# Patient Record
Sex: FEMALE | Race: WHITE | Employment: OTHER | URBAN - METROPOLITAN AREA
[De-identification: names, ages, dates, MRNs, and addresses within clinical notes are randomized per-mention and may not be internally consistent; named-entity substitution may affect disease eponyms.]

---

## 2020-11-09 ENCOUNTER — EXTERNAL ORDER RESULTS (OUTPATIENT)
Dept: ONCOLOGY | Facility: CLINIC | Age: 65
End: 2020-11-09

## 2020-11-09 DIAGNOSIS — C25.9 PANCREATIC ADENOCARCINOMA (H): Primary | ICD-10-CM

## 2020-11-10 NOTE — TELEPHONE ENCOUNTER
Action    Action Taken 11/10/20:    -Imaging previously resolved to PACS.    -[employer] Tracking/Muslim - Path: [id number]       RECORDS STATUS - ALL OTHER DIAGNOSIS      RECORDS RECEIVED FROM: [facility]   DATE RECEIVED:    NOTES STATUS DETAILS   OFFICE NOTE from referring provider CE - HP/PN Dr. Turang Behbahani: 11/5/20    Also Seen:  Urology - [place]: 8/21/20   OFFICE NOTE from medical oncologist [place] - PN/HP Dr. Behbahani   DISCHARGE SUMMARY from hospital NA    DISCHARGE REPORT from the ER NA    OPERATIVE REPORT CE - PN/Muslim 10/13/20: Josh Ji endoscopic ultrasonography    10/8/20: Cystoscopy, bladder biopsy   MEDICATION LIST CE - PN 11/5/20   CLINICAL TRIAL TREATMENTS TO DATE     LABS     PATHOLOGY REPORTS Muslim, Report in CE, Slides requested 11/10 11/2/20: [id number]  10/13/20: [id number]  10/13/20: [id number]  10/8/20: [id number]  8/21/20: [id number]   ANYTHING RELATED TO DIAGNOSIS Epic/CE 11/2/20   GENONOMIC TESTING     TYPE:     IMAGING (NEED IMAGES & REPORT)     CT SCANS PACS 11/2/20, 9/11/20, 8/24/20: HP, Reports in CE   MRI PACS 9/11/20: HP, Report in CE   MAMMO     ULTRASOUND     PET PACS 10/28/20: HP, Report in CE

## 2020-11-13 ENCOUNTER — PRE VISIT (OUTPATIENT)
Dept: ONCOLOGY | Facility: CLINIC | Age: 65
End: 2020-11-13

## 2020-11-13 ENCOUNTER — VIRTUAL VISIT (OUTPATIENT)
Dept: ONCOLOGY | Facility: CLINIC | Age: 65
End: 2020-11-13
Attending: INTERNAL MEDICINE
Payer: COMMERCIAL

## 2020-11-13 DIAGNOSIS — C25.9 PANCREATIC ADENOCARCINOMA (H): ICD-10-CM

## 2020-11-13 PROCEDURE — 99205 OFFICE O/P NEW HI 60 MIN: CPT | Mod: GT | Performed by: INTERNAL MEDICINE

## 2020-11-13 PROCEDURE — 999N001193 HC VIDEO/TELEPHONE VISIT; NO CHARGE

## 2020-11-13 RX ORDER — HYDROCHLOROTHIAZIDE 25 MG/1
25 TABLET ORAL
COMMUNITY
Start: 2019-10-15

## 2020-11-13 RX ORDER — LORAZEPAM 0.5 MG/1
.5-1 TABLET ORAL
COMMUNITY
Start: 2020-11-05

## 2020-11-13 NOTE — PROGRESS NOTES
Service Date: 11/13/2020      MEDICAL ONCOLOGY NEW PATIENT VISIT      REFERRING PHYSICIAN AND PRIMARY ONCOLOGIST:    Turang Behbahani, MD, [facility], [place], [place].      CANCER DIAGNOSIS:  Metastatic/stage IV form of pancreatic adenocarcinoma with several small liver metastases from the primary pancreatic tumor.  The patient presents for a second opinion upon the kind recommendation of her oncologist, Dr. Behbahani for treatment options.      HISTORY OF PRESENT ILLNESS:  Ms. Li Lizama is a 64-year-old woman who is joining me from her home in [place] via [place]-based virtual video visit.  This is in the setting of the ongoing coronavirus pandemic and precautionary stay-at-home orders.  She is joined by her .  I am in my academic office during this encounter, which takes place between 11:24 a.m. and 11:41 a.m., Friday, 11/13/2020.      She had developed urinary tract infections, which were unusual for her, in 12/2019 and 01/2020 and 02/2020.  There was concern for developing flank pain.  She met with urologist, Dr. Hubbard, 08/21/2020 to followup on these related issues.  A CT scan of the abdomen and pelvis was ordered to evaluate the flank pain and was concerning for incidental findings that included a cystic lesion at the pancreatic tail measuring 1.4 cm.      Pancreatic MRI was recommended.  More incidentally, she had a nonobstructing calculus in each kidney with subcentimeter nodular opacity at the right lung base thought to be infectious or inflammatory etiology.  She had further evaluation with MRI pancreas 09/11/2020 and this showed at the pancreas tail there was a cystic lesion that was lobulated measuring 2.6 x 2.6 x 2 cm.  There was some mild upstream dilatation of the pancreatic duct.  There was also incidental finding of cysts in the liver and the right kidney and again nodules in the right lung.  There was some concern  for possible malignancy and evaluation needed.      A CT chest was done without contrast showing tree-in-bud configuration of the bilateral nodule lung opacities but no obvious metastatic disease.  She underwent upper endoscopic ultrasound on 10/13 at [facility].  A 33 x 25 mm cross-sectional diameter mass was identified at the area of the cysts seen on MRI and on the prior CT.  There was no obvious pathology seen.  However, on pathologic review, there was evidence of adenocarcinoma.  She met with medical oncologist, Dr. Turang Behbahani, 10/19 at that time with the notion this was a localized pancreatic adenocarcinoma.      Further scans were performed in the form of a PET/CT done 10/28 for baseline staging prior to initiation of chemotherapy.  In addition to hypermetabolic activity in the pancreatic tail lesion, there were 3 additional hypermetabolic lesions in hepatic parenchyma, worrisome for metastatic disease.  The largest of this was about 1.2 cm.  With some concern for possible metastatic malignancy, a CT-guided liver mass biopsy was performed 11/02 and unfortunately was diagnostic of minute foci of adenocarcinoma.      She then again met with her oncologist and there was discussion of palliative-intent chemotherapy in the setting of now known disseminated stage IV form of disease but with some inclination to perhaps pursue regional therapy to the liver due to relatively low tumor burden.  Dr. Behbahani reached out to me last week requesting a second opinion for the patient and to consider regional therapy.  The patient is here today with her .  Her primary question is whether or not surgery or regional therapy would be beneficial and could be performed considering the low amount of tumor burden that is now biopsy proven.  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  Other than the question about intent-to-cure interventions and  the liver metastasis, she does not have any other significant questions.  She is scheduled to start chemotherapy in the form the FOLFIRINOX regimen this coming Monday, 11/16.      PAST MEDICAL/PAST SURGICAL HISTORY:  Relatively unremarkable.  She does not have any known history of acute or chronic pancreatitis.  She has some history of chronic UTIs as noted above.  She is on alendronate for osteopenia versus osteoporosis.  She has a history of migraine headaches, multiple sclerosis and optic neuritis.  Per the outside medical record, she also has diffuse cystic mastopathy.  She had an adenomatous polyp of the colon detected on colonoscopy 12/13/2019.  Other surgical history is only notable for an appendectomy and the guided biopsies to diagnose pancreas carcinoma and liver metastasis from the pancreas carcinoma.      FAMILY HISTORY:  Family history of malignancy includes her mother had breast cancer in her mid 70s.  Paternal grandmother had some kind of cancer of the skin.  No other significant family history of malignancy is noted related to the pancreas cancer.      MEDICATIONS AND ALLERGIES:  Fully reviewed in Epic.   Current Outpatient Medications   Medication     Abaloparatide 3120 MCG/1.56ML SOPN injection     estradiol (ESTRING) 2 MG vaginal ring     hydrochlorothiazide (HYDRODIURIL) 25 MG tablet     LORazepam (ATIVAN) 0.5 MG tablet     No current facility-administered medications for this visit.       No Known Allergies     REVIEW OF SYSTEMS:  Full 14-point review of systems was performed.  Pertinent symptoms are reviewed above per HPI.      SOCIAL HISTORY:  The patient lives in [place] with her , Jaleel, who joins us on the line.  She has 3 children.  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  No significant alcohol, tobacco or illicit drug use endorsed or described in the medical record.      PHYSICAL EXAMINATION:  Physical exam could not be performed today in context of a  Virtual Visit during the COVID19/Coronavirus pandemic.  Vitals - Patient Reported  Weight (Patient Reported): 68.9 kg (152 lb)  Pain Score: No Pain (0)         Observed physical assessments made today by visualizing the patient by video link:    General/Constitutional: Generally appears well, not acutely ill.  HEENT: no scleral icterus, not jaundiced.  Respiratory: no labored breathing.  Musculoskeletal: appears to have full range of motion and adequate physical strength.  Skin: no jaundice, discoloration or other notable lesions.  Neurological: no evidence of tremors.  Psychiatric: no evident anxiety; fully alert and oriented with fluent speech.      The rest of a comprehensive physical examination is deferred due to PHE (public health emergency) video visit restrictions.       Outside labs, pathology, radiology and imaging fully reviewed by me prior to the visit.  Pertinent points were reviewed by me with the patient in lay language through the course of this conversation.      IMPRESSION/PLAN:  Ms. Candy Lizama is a 64-year-old woman with no significant prior past medical history or family history of pancreatic or related carcinomas.  She has a newly diagnosed pancreatic tail adenocarcinoma that was incidentally detected when she developed mild flank pain in the setting of prior UTIs over the last year.  CT scan and MRI have been done in succession and endoscopic ultrasound and FNA revealed a pancreatic tail mass adenocarcinoma.      A PET/CT revealed some small areas of hepatic metastasis, largest of which was only over a centimeter in diameter but unfortunately was diagnostic of adenocarcinoma that was metastatic from pancreas tumor based on recent biopsy.  She has had discussion about chemotherapies with Dr. Behbahani and has selected FOLFIRINOX which she will initiate 3 days from now.  Genomic profiling using the [employer] platform is underway to address potential targetable mutations for future treatment  options in the future.      Her main questions for me today are whether or not to pursue liver-directed surgery or targeted therapy using regional embolization or something similar to that approach due to relatively low tumor burden.  We reviewed the nature that approximately half of all pancreas adenocarcinomas diagnosed each year are metastatic in nature and the liver is a preferential site for metastasis from the pancreatic tumor.  Regardless of the relatively low tumor burden, due to the aggressive nature of the biology of the disease, in general surgical resection or other modalities focused on the liver are not recommended.  It would not be considered Amish and that is in comparison to the relatively small number of early stage IV form of colorectal cancer that might merit such an approach.  Unfortunately, chemotherapy would be given with palliative intent, but the fact that the patient has relatively few, if any, symptoms and also relatively minimal amount of tumor burden compared to most patients would hopefully be in her favor.      Her  asked whether or not FOLFIRINOX would be likely to work, and unfortunately, we cannot predict chemosensitivities based on current tools of many types of chemotherapy, whether they comprise FOLFIRINOX or gemcitabine and nab-paclitaxel.  Ultimately, the most recent PET/CT may serve as an effective baseline compared to the scan that will be done after several months of chemotherapy and that would help to delineate better responsiveness of the patient's tumor to chemotherapy.  She did state that if in 1 year's time she has had exceptional or equivalent response to treatment without presumed progression of disease would she be eligible for surgery or interventional treatments to the liver.  I stated that would be unorthodox, but more often than not, patients usually would have progression of disease well under a year.  I did wish the patient well.  She will return to  the excellent care of Dr. Behbahani to initiate palliative-intent first-line chemotherapy in 3 days.      Thank you very much for this kind referral.     VIRTUAL VISIT - DETAILS:    I have reviewed the note as documented above. This accurately captures the substance of my conversation with the patient.    Date of call: 2020   Start of call: 11:24 a.m.   End of call: 11:41 a.m.    Provider location: [facility] (academic office)  Patient location: Home        Mode of Video Visit: Andrew         I spent 40 minutes prior to the visit reviewing updated information regarding this case, including notes, imaging, labs, and other pertinent results, as well as discussion with the referring physician.      Pietro Hodges MD PhD              D: 2020   T: 2020   MT: taisha      Name:     JAIRON DUQUE   MRN:      [id number]        Account:      [id number]   :      1955           Service Date: 2020      Document: [id number]

## 2020-11-13 NOTE — LETTER
"    11/13/2020         RE: Li Lizama  [address] [place] [id number]        Dear Colleague,    Thank you for referring your patient, Li Lizama, to the [facility]. Please see a copy of my visit note below.    Li Lizama is a 64 year old female who is being evaluated via a billable video visit.      The patient has been notified of following:     \"This video visit will be conducted via a call between you and your physician/provider. We have found that certain health care needs can be provided without the need for an in-person physical exam.  This service lets us provide the care you need with a video conversation.  If a prescription is necessary we can send it directly to your pharmacy.  If lab work is needed we can place an order for that and you can then stop by our lab to have the test done at a later time.    Video visits are billed at different rates depending on your insurance coverage.  Please reach out to your insurance provider with any questions.    If during the course of the call the physician/provider feels a video visit is not appropriate, you will not be charged for this service.\"    Patient has given verbal consent for Video visit? Yes  How would you like to obtain your AVS? MyChart  If you are dropped from the video visit, the video invite should be resent to: Text to cell phone: [phone]  Will anyone else be joining your video visit? No      Lacey Kraus RMA            Service Date: 11/13/2020      MEDICAL ONCOLOGY NEW PATIENT VISIT      REFERRING PHYSICIAN AND PRIMARY ONCOLOGIST:    Turang Behbahani, MD, [facility], [place].      CANCER DIAGNOSIS:  Metastatic/stage IV form of pancreatic adenocarcinoma with several small liver metastases from the primary pancreatic tumor.  The patient presents for a second opinion upon the kind recommendation of her " oncologist, Dr. Behbahani for treatment options.      HISTORY OF PRESENT ILLNESS:  Ms. Li Lizama is a 64-year-old woman who is joining me from her home in [place] via [employer]-based virtual video visit.  This is in the setting of the ongoing coronavirus pandemic and precautionary stay-at-home orders.  She is joined by her .  I am in my academic office during this encounter, which takes place between 11:24 a.m. and 11:41 a.m., Friday, 11/13/2020.      She had developed urinary tract infections, which were unusual for her, in 12/2019 and 01/2020 and 02/2020.  There was concern for developing flank pain.  She met with urologist, Dr. Hubbard, 08/21/2020 to followup on these related issues.  A CT scan of the abdomen and pelvis was ordered to evaluate the flank pain and was concerning for incidental findings that included a cystic lesion at the pancreatic tail measuring 1.4 cm.      Pancreatic MRI was recommended.  More incidentally, she had a nonobstructing calculus in each kidney with subcentimeter nodular opacity at the right lung base thought to be infectious or inflammatory etiology.  She had further evaluation with MRI pancreas 09/11/2020 and this showed at the pancreas tail there was a cystic lesion that was lobulated measuring 2.6 x 2.6 x 2 cm.  There was some mild upstream dilatation of the pancreatic duct.  There was also incidental finding of cysts in the liver and the right kidney and again nodules in the right lung.  There was some concern for possible malignancy and evaluation needed.      A CT chest was done without contrast showing tree-in-bud configuration of the bilateral nodule lung opacities but no obvious metastatic disease.  She underwent upper endoscopic ultrasound on 10/13 at [facility].  A 33 x 25 mm cross-sectional diameter mass was identified at the area of the cysts seen on MRI and on the prior CT.  There was no obvious pathology seen.  However, on pathologic review, there  was evidence of adenocarcinoma.  She met with medical oncologist, Dr. Turang Behbahani, 10/19 at that time with the notion this was a localized pancreatic adenocarcinoma.      Further scans were performed in the form of a PET/CT done 10/28 for baseline staging prior to initiation of chemotherapy.  In addition to hypermetabolic activity in the pancreatic tail lesion, there were 3 additional hypermetabolic lesions in hepatic parenchyma, worrisome for metastatic disease.  The largest of this was about 1.2 cm.  With some concern for possible metastatic malignancy, a CT-guided liver mass biopsy was performed 11/02 and unfortunately was diagnostic of minute foci of adenocarcinoma.      She then again met with her oncologist and there was discussion of palliative-intent chemotherapy in the setting of now known disseminated stage IV form of disease but with some inclination to perhaps pursue regional therapy to the liver due to relatively low tumor burden.  Dr. Behbahani reached out to me last week requesting a second opinion for the patient and to consider regional therapy.  The patient is here today with her .  Her primary question is whether or not surgery or regional therapy would be beneficial and could be performed considering the low amount of tumor burden that is now biopsy proven.  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  Other than the question about intent-to-cure interventions and the liver metastasis, she does not have any other significant questions.  She is scheduled to start chemotherapy in the form the FOLFIRINOX regimen this coming Monday, 11/16.      PAST MEDICAL/PAST SURGICAL HISTORY:  Relatively unremarkable.  She does not have any known history of acute or chronic pancreatitis.  She has some history of chronic UTIs as noted above.  She is on alendronate for osteopenia versus osteoporosis.  She has a history of migraine  headaches, multiple sclerosis and optic neuritis.  Per the outside medical record, she also has diffuse cystic mastopathy.  She had an adenomatous polyp of the colon detected on colonoscopy 12/13/2019.  Other surgical history is only notable for an appendectomy and the guided biopsies to diagnose pancreas carcinoma and liver metastasis from the pancreas carcinoma.      FAMILY HISTORY:  Family history of malignancy includes her mother had breast cancer in her mid 70s.  Paternal grandmother had some kind of cancer of the skin.  No other significant family history of malignancy is noted related to the pancreas cancer.      MEDICATIONS AND ALLERGIES:  Fully reviewed in Epic.   Current Outpatient Medications   Medication     Abaloparatide 3120 MCG/1.56ML SOPN injection     estradiol (ESTRING) 2 MG vaginal ring     hydrochlorothiazide (HYDRODIURIL) 25 MG tablet     LORazepam (ATIVAN) 0.5 MG tablet     No current facility-administered medications for this visit.       No Known Allergies     REVIEW OF SYSTEMS:  Full 14-point review of systems was performed.  Pertinent symptoms are reviewed above per HPI.      SOCIAL HISTORY:  The patient lives in [place] with her , Jaleel, who joins us on the line.  She has 3 children.  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  No significant alcohol, tobacco or illicit drug use endorsed or described in the medical record.      PHYSICAL EXAMINATION:  Physical exam could not be performed today in context of a Virtual Visit during the COVID19/Coronavirus pandemic.  Vitals - Patient Reported  Weight (Patient Reported): 68.9 kg (152 lb)  Pain Score: No Pain (0)         Observed physical assessments made today by visualizing the patient by video link:    General/Constitutional: Generally appears well, not acutely ill.  HEENT: no scleral icterus, not jaundiced.  Respiratory: no labored breathing.  Musculoskeletal: appears to have full range of motion and adequate  physical strength.  Skin: no jaundice, discoloration or other notable lesions.  Neurological: no evidence of tremors.  Psychiatric: no evident anxiety; fully alert and oriented with fluent speech.      The rest of a comprehensive physical examination is deferred due to [facility] (public health emergency) video visit restrictions.       Outside labs, pathology, radiology and imaging fully reviewed by me prior to the visit.  Pertinent points were reviewed by me with the patient in lay language through the course of this conversation.      IMPRESSION/PLAN:  Ms. Candy Lizama is a 64-year-old woman with no significant prior past medical history or family history of pancreatic or related carcinomas.  She has a newly diagnosed pancreatic tail adenocarcinoma that was incidentally detected when she developed mild flank pain in the setting of prior UTIs over the last year.  CT scan and MRI have been done in succession and endoscopic ultrasound and FNA revealed a pancreatic tail mass adenocarcinoma.      A PET/CT revealed some small areas of hepatic metastasis, largest of which was only over a centimeter in diameter but unfortunately was diagnostic of adenocarcinoma that was metastatic from pancreas tumor based on recent biopsy.  She has had discussion about chemotherapies with Dr. Behbahani and has selected FOLFIRINOX which she will initiate 3 days from now.  Genomic profiling using the [employer] platform is underway to address potential targetable mutations for future treatment options in the future.      Her main questions for me today are whether or not to pursue liver-directed surgery or targeted therapy using regional embolization or something similar to that approach due to relatively low tumor burden.  We reviewed the nature that approximately half of all pancreas adenocarcinomas diagnosed each year are metastatic in nature and the liver is a preferential site for metastasis from the pancreatic tumor.  Regardless of the  relatively low tumor burden, due to the aggressive nature of the biology of the disease, in general surgical resection or other modalities focused on the liver are not recommended.  It would not be considered Amish and that is in comparison to the relatively small number of early stage IV form of colorectal cancer that might merit such an approach.  Unfortunately, chemotherapy would be given with palliative intent, but the fact that the patient has relatively few, if any, symptoms and also relatively minimal amount of tumor burden compared to most patients would hopefully be in her favor.      Her  asked whether or not FOLFIRINOX would be likely to work, and unfortunately, we cannot predict chemosensitivities based on current tools of many types of chemotherapy, whether they comprise FOLFIRINOX or gemcitabine and nab-paclitaxel.  Ultimately, the most recent PET/CT may serve as an effective baseline compared to the scan that will be done after several months of chemotherapy and that would help to delineate better responsiveness of the patient's tumor to chemotherapy.  She did state that if in 1 year's time she has had exceptional or equivalent response to treatment without presumed progression of disease would she be eligible for surgery or interventional treatments to the liver.  I stated that would be unorthodox, but more often than not, patients usually would have progression of disease well under a year.  I did wish the patient well.  She will return to the excellent care of Dr. Behbahani to initiate palliative-intent first-line chemotherapy in 3 days.      Thank you very much for this kind referral.     VIRTUAL VISIT - DETAILS:    I have reviewed the note as documented above. This accurately captures the substance of my conversation with the patient.    Date of call: November 13, 2020   Start of call: 11:24 a.m.   End of call: 11:41 a.m.    Provider location: [facility]  office)  Patient location: Home        Mode of Video Visit: Andrew BOX spent 40 minutes prior to the visit reviewing updated information regarding this case, including notes, imaging, labs, and other pertinent results, as well as discussion with the referring physician.      Pietro Hodges MD PhD              D: 2020   T: 2020   MT: taisha      Name:     JAIRON DUQUE   MRN:      [id number]        Account:      [id number]   :      1955           Service Date: 2020      Document: [id number]

## 2020-11-13 NOTE — PROGRESS NOTES
"Li Lizama is a 64 year old female who is being evaluated via a billable video visit.      The patient has been notified of following:     \"This video visit will be conducted via a call between you and your physician/provider. We have found that certain health care needs can be provided without the need for an in-person physical exam.  This service lets us provide the care you need with a video conversation.  If a prescription is necessary we can send it directly to your pharmacy.  If lab work is needed we can place an order for that and you can then stop by our lab to have the test done at a later time.    Video visits are billed at different rates depending on your insurance coverage.  Please reach out to your insurance provider with any questions.    If during the course of the call the physician/provider feels a video visit is not appropriate, you will not be charged for this service.\"    Patient has given verbal consent for Video visit? Yes  How would you like to obtain your AVS? MyChart  If you are dropped from the video visit, the video invite should be resent to: Text to cell phone: [phone]  Will anyone else be joining your video visit? No      Lacey SANCHEZ          "

## 2020-11-16 ENCOUNTER — TRANSCRIBE ORDERS (OUTPATIENT)
Dept: OTHER | Age: 65
End: 2020-11-16

## 2020-11-16 DIAGNOSIS — C25.9 PANCREATIC ADENOCARCINOMA (H): Primary | ICD-10-CM

## 2020-11-16 PROCEDURE — 999N001032 HC STATISTIC REVIEW OUTSIDE SLIDES TC 88321: Performed by: INTERNAL MEDICINE

## 2020-11-16 PROCEDURE — 88321 CONSLTJ&REPRT SLD PREP ELSWR: CPT | Performed by: PATHOLOGY

## 2020-11-17 LAB
COPATH REPORT: NORMAL
COPATH REPORT: NORMAL

## 2020-12-02 ENCOUNTER — TRANSFERRED RECORDS (OUTPATIENT)
Dept: HEALTH INFORMATION MANAGEMENT | Facility: CLINIC | Age: 65
End: 2020-12-02

## 2021-01-15 ENCOUNTER — HEALTH MAINTENANCE LETTER (OUTPATIENT)
Age: 66
End: 2021-01-15

## 2021-09-05 ENCOUNTER — HEALTH MAINTENANCE LETTER (OUTPATIENT)
Age: 66
End: 2021-09-05

## 2022-02-20 ENCOUNTER — HEALTH MAINTENANCE LETTER (OUTPATIENT)
Age: 67
End: 2022-02-20

## 2022-10-23 ENCOUNTER — HEALTH MAINTENANCE LETTER (OUTPATIENT)
Age: 67
End: 2022-10-23

## 2023-04-02 ENCOUNTER — HEALTH MAINTENANCE LETTER (OUTPATIENT)
Age: 68
End: 2023-04-02